# Patient Record
Sex: FEMALE | Race: BLACK OR AFRICAN AMERICAN
[De-identification: names, ages, dates, MRNs, and addresses within clinical notes are randomized per-mention and may not be internally consistent; named-entity substitution may affect disease eponyms.]

---

## 2018-06-27 ENCOUNTER — HOSPITAL ENCOUNTER (OUTPATIENT)
Dept: HOSPITAL 74 - JASU-SURG | Age: 68
Discharge: HOME | End: 2018-06-27
Attending: OPHTHALMOLOGY
Payer: COMMERCIAL

## 2018-06-27 VITALS — BODY MASS INDEX: 23 KG/M2

## 2018-06-27 VITALS — TEMPERATURE: 98 F

## 2018-06-27 VITALS — SYSTOLIC BLOOD PRESSURE: 153 MMHG | HEART RATE: 74 BPM | DIASTOLIC BLOOD PRESSURE: 81 MMHG

## 2018-06-27 DIAGNOSIS — H57.8: ICD-10-CM

## 2018-06-27 DIAGNOSIS — H26.9: Primary | ICD-10-CM

## 2018-06-27 PROCEDURE — 08RK3JZ REPLACEMENT OF LEFT LENS WITH SYNTHETIC SUBSTITUTE, PERCUTANEOUS APPROACH: ICD-10-PCS | Performed by: OPHTHALMOLOGY

## 2018-06-27 RX ADMIN — FLURBIPROFEN SODIUM SCH DROP: 0.3 SOLUTION/ DROPS OPHTHALMIC at 09:19

## 2018-06-27 RX ADMIN — FLURBIPROFEN SODIUM SCH DROP: 0.3 SOLUTION/ DROPS OPHTHALMIC at 08:53

## 2018-06-27 RX ADMIN — TROPICAMIDE SCH DROP: 10 SOLUTION/ DROPS OPHTHALMIC at 09:19

## 2018-06-27 RX ADMIN — OFLOXACIN SCH DROP: 3 SOLUTION/ DROPS OPHTHALMIC at 09:19

## 2018-06-27 RX ADMIN — CYCLOPENTOLATE HYDROCHLORIDE SCH DROP: 10 SOLUTION/ DROPS OPHTHALMIC at 09:19

## 2018-06-27 RX ADMIN — OFLOXACIN SCH DROP: 3 SOLUTION/ DROPS OPHTHALMIC at 08:59

## 2018-06-27 RX ADMIN — FLURBIPROFEN SODIUM SCH DROP: 0.3 SOLUTION/ DROPS OPHTHALMIC at 08:58

## 2018-06-27 RX ADMIN — PHENYLEPHRINE HYDROCHLORIDE SCH DROP: 0.25 SPRAY NASAL at 08:59

## 2018-06-27 RX ADMIN — PHENYLEPHRINE HYDROCHLORIDE SCH DROP: 0.25 SPRAY NASAL at 09:19

## 2018-06-27 RX ADMIN — OFLOXACIN SCH DROP: 3 SOLUTION/ DROPS OPHTHALMIC at 08:53

## 2018-06-27 RX ADMIN — TROPICAMIDE SCH DROP: 10 SOLUTION/ DROPS OPHTHALMIC at 08:59

## 2018-06-27 RX ADMIN — TROPICAMIDE SCH DROP: 10 SOLUTION/ DROPS OPHTHALMIC at 08:53

## 2018-06-27 RX ADMIN — PHENYLEPHRINE HYDROCHLORIDE SCH DROP: 0.25 SPRAY NASAL at 08:53

## 2018-06-27 RX ADMIN — CYCLOPENTOLATE HYDROCHLORIDE SCH DROP: 10 SOLUTION/ DROPS OPHTHALMIC at 08:58

## 2018-06-27 RX ADMIN — CYCLOPENTOLATE HYDROCHLORIDE SCH DROP: 10 SOLUTION/ DROPS OPHTHALMIC at 08:52

## 2018-06-27 NOTE — SPEC
DATE OF SURGERY:  

 

OPERATION:  Phacoemulsification of left cataract, capsular staining with Trypan blue

and intraocular lens implantation, lens used [id number], 22.0 Diopter power, Serial No.

[id number].

 

PREOPERATIVE DIAGNOSIS:  Cataract left eye.

 

ASSOCIATIVE DIAGNOSIS:  

 

POSTOPERATIVE DIAGNOSIS:  Cortical cataract left eye.

 

SURGEON:  Corey Bearden M.D.

 

ANESTHESIA:  Topical MAC.

 

COMPLICATIONS:  None.

 

PROCEDURE:  The patient was brought to the operating room and correctly identified

along with the operative site as well as correct intraocular lens nickerson.  The

patient was then prepped and draped in the usual sterile fashion including 5%

Betadine solution in the conjunctival sac and an eyelid drape.

 

An eyelid speculum was then placed into the operative eye.  The eye was inspected and

a poor red reflex was noted.  A paracentesis port was created and .5 mL of

intracameral preservative-free Lidocaine 1% was given.  Beneath an air bubble, the

capsule was then stained with Trypan blue.  The Trypan blue was then irrigated from

the eye with balanced salt solution (BBS).  Viscoelastic was injected to inflate the

anterior chamber.  A temporal clear corneal would was created.  A continuous circular

capsulorrhexis was performed.  The nucleus was then hydro-dissected and

hydro-delineated was BSS and removed with phacoemulsification via divide-and-conquer

approach.

 

The remaining cortical material was irrigated and aspirated from the eye. 

Viscoelastic was injected in the anterior chamber to inflate the capsular bag.  The

intraocular lens was then injected into the bag.

 

The Viscoelastic was irrigated and aspirated from the eye.  All wounds were tested

and found to be watertight.  No suture was placed.  The intraocular lens was noted to

be well centered and covered by the anterior capsular border.  Topical Vancomycin was

given.  The eye was patched and shielded. 

 

The patient was discharged from the operating room in stable condition.

 

 

LAMONT GALINDO/[id number]

DD: 06/27/2018 10:38

DT: 06/27/2018 11:36

Job #:  [id number]

## 2023-11-10 ENCOUNTER — OFFICE (OUTPATIENT)
Dept: URBAN - METROPOLITAN AREA CLINIC 30 | Facility: CLINIC | Age: 73
Setting detail: OPHTHALMOLOGY
End: 2023-11-10
Payer: COMMERCIAL

## 2023-11-10 DIAGNOSIS — H25.11: ICD-10-CM

## 2023-11-10 DIAGNOSIS — H35.013: ICD-10-CM

## 2023-11-10 DIAGNOSIS — H25.13: ICD-10-CM

## 2023-11-10 PROCEDURE — 92004 COMPRE OPH EXAM NEW PT 1/>: CPT | Performed by: OPHTHALMOLOGY

## 2023-11-10 PROCEDURE — 92136 OPHTHALMIC BIOMETRY: CPT | Mod: RT | Performed by: OPHTHALMOLOGY

## 2023-11-10 PROCEDURE — 92025 CPTRIZED CORNEAL TOPOGRAPHY: CPT | Performed by: OPHTHALMOLOGY

## 2023-11-10 PROCEDURE — 92134 CPTRZ OPH DX IMG PST SGM RTA: CPT | Performed by: OPHTHALMOLOGY

## 2023-11-10 ASSESSMENT — REFRACTION_MANIFEST
OS_CYLINDER: +1.00
OD_AXIS: 59
OD_VA1: 20/100
OS_AXIS: 174
OD_SPHERE: +0.50
OD_CYLINDER: +0.75
OS_VA1: 20/20
OS_SPHERE: -0.50

## 2023-11-10 ASSESSMENT — REFRACTION_AUTOREFRACTION
OD_CYLINDER: +0.75
OS_CYLINDER: +1.00
OD_AXIS: 59
OS_AXIS: 174
OS_SPHERE: -0.50
OD_SPHERE: +0.50

## 2023-11-10 ASSESSMENT — SPHEQUIV_DERIVED
OS_SPHEQUIV: 0
OD_SPHEQUIV: 0.875
OD_SPHEQUIV: 0.875
OS_SPHEQUIV: 0

## 2023-11-10 ASSESSMENT — CONFRONTATIONAL VISUAL FIELD TEST (CVF)
OS_FINDINGS: FULL
OD_FINDINGS: FULL